# Patient Record
Sex: MALE | Race: ASIAN | NOT HISPANIC OR LATINO | URBAN - METROPOLITAN AREA
[De-identification: names, ages, dates, MRNs, and addresses within clinical notes are randomized per-mention and may not be internally consistent; named-entity substitution may affect disease eponyms.]

---

## 2022-09-20 RX ORDER — SODIUM CHLORIDE 9 MG/ML
1000 INJECTION, SOLUTION INTRAVENOUS
Refills: 0 | Status: DISCONTINUED | OUTPATIENT
Start: 2022-09-21 | End: 2022-09-21

## 2022-09-20 NOTE — ASU PATIENT PROFILE, ADULT - ABLE TO REACH PT
Time and instructions left on voice mail, no solid food after midnight , can have water or clear apple juice no pulp before 06;30 a.m. Tuesday bring photo ID, insurance and vaccination cards, someone is to come pick her up after appointment/no

## 2022-09-21 ENCOUNTER — OUTPATIENT (OUTPATIENT)
Dept: OUTPATIENT SERVICES | Facility: HOSPITAL | Age: 57
LOS: 1 days | Discharge: HOME | End: 2022-09-21

## 2022-09-21 VITALS
HEART RATE: 72 BPM | TEMPERATURE: 98 F | SYSTOLIC BLOOD PRESSURE: 120 MMHG | HEIGHT: 72 IN | WEIGHT: 186.29 LBS | OXYGEN SATURATION: 99 % | RESPIRATION RATE: 16 BRPM

## 2022-09-21 VITALS
RESPIRATION RATE: 16 BRPM | DIASTOLIC BLOOD PRESSURE: 80 MMHG | TEMPERATURE: 98 F | HEART RATE: 76 BPM | OXYGEN SATURATION: 98 % | SYSTOLIC BLOOD PRESSURE: 123 MMHG

## 2022-09-21 DIAGNOSIS — K08.409 PARTIAL LOSS OF TEETH, UNSPECIFIED CAUSE, UNSPECIFIED CLASS: Chronic | ICD-10-CM

## 2022-09-21 DIAGNOSIS — Z90.89 ACQUIRED ABSENCE OF OTHER ORGANS: Chronic | ICD-10-CM

## 2022-09-21 DEVICE — IMPLANTABLE DEVICE
Type: IMPLANTABLE DEVICE | Site: LEFT | Status: NON-FUNCTIONAL
Removed: 2022-09-21

## 2022-09-21 RX ORDER — OFLOXACIN 0.3 %
1 DROPS OPHTHALMIC (EYE)
Refills: 0 | Status: COMPLETED | OUTPATIENT
Start: 2022-09-21 | End: 2022-09-21

## 2022-09-21 RX ORDER — CYCLOPENTOLATE HYDROCHLORIDE 10 MG/ML
1 SOLUTION/ DROPS OPHTHALMIC
Refills: 0 | Status: COMPLETED | OUTPATIENT
Start: 2022-09-21 | End: 2022-09-21

## 2022-09-21 RX ORDER — KETOROLAC TROMETHAMINE 0.5 %
1 DROPS OPHTHALMIC (EYE)
Refills: 0 | Status: COMPLETED | OUTPATIENT
Start: 2022-09-21 | End: 2022-09-21

## 2022-09-21 RX ORDER — ACETAMINOPHEN 500 MG
650 TABLET ORAL ONCE
Refills: 0 | Status: DISCONTINUED | OUTPATIENT
Start: 2022-09-21 | End: 2022-09-21

## 2022-09-21 RX ORDER — TROPICAMIDE 1 %
1 DROPS OPHTHALMIC (EYE)
Refills: 0 | Status: COMPLETED | OUTPATIENT
Start: 2022-09-21 | End: 2022-09-21

## 2022-09-21 RX ORDER — PHENYLEPHRINE HCL 2.5 %
1 DROPS OPHTHALMIC (EYE)
Refills: 0 | Status: COMPLETED | OUTPATIENT
Start: 2022-09-21 | End: 2022-09-21

## 2022-09-21 RX ADMIN — CYCLOPENTOLATE HYDROCHLORIDE 1 DROP(S): 10 SOLUTION/ DROPS OPHTHALMIC at 08:48

## 2022-09-21 RX ADMIN — Medication 1 DROP(S): at 08:39

## 2022-09-21 RX ADMIN — Medication 1 DROP(S): at 09:01

## 2022-09-21 RX ADMIN — Medication 1 DROP(S): at 08:48

## 2022-09-21 RX ADMIN — Medication 1 DROP(S): at 08:40

## 2022-09-21 RX ADMIN — Medication 1 DROP(S): at 09:00

## 2022-09-21 RX ADMIN — Medication 1 DROP(S): at 08:49

## 2022-09-21 RX ADMIN — Medication 1 DROP(S): at 08:41

## 2022-09-21 RX ADMIN — CYCLOPENTOLATE HYDROCHLORIDE 1 DROP(S): 10 SOLUTION/ DROPS OPHTHALMIC at 09:01

## 2022-09-21 RX ADMIN — CYCLOPENTOLATE HYDROCHLORIDE 1 DROP(S): 10 SOLUTION/ DROPS OPHTHALMIC at 08:39

## 2022-09-21 NOTE — ASU PREOP CHECKLIST - AS TEMP SITE
Quality 226: Preventive Care And Screening: Tobacco Use: Screening And Cessation Intervention: Patient screened for tobacco use and is an ex/non-smoker Detail Level: Detailed Quality 130: Documentation Of Current Medications In The Medical Record: Current Medications Documented temporal

## 2022-09-21 NOTE — ASU DISCHARGE PLAN (ADULT/PEDIATRIC) - NS MD DC FALL RISK RISK
For information on Fall & Injury Prevention, visit: [email or web address] OR  [email or web address] OR  https://www.cdc.gov/steadi/patient.html

## 2022-09-21 NOTE — ASU PREOP CHECKLIST - NS PREOP CHK HIBICLENS NA
TCT son, Nicole Barker and explained CM program and CM role  Pt resides with autistic son in a house, [address], bed/bathroom 2nd floor  Independent prior to admission for ADL's and ambulation  PCP Ro  Has prescription plan, utilizes [facility] [address]  HHC/DME/IP Rehab- denies  Denies mental health illness, drug and/or alcohol abuse  She drives  Primary contact son Klever Owens [phone]  POA- paperwork completed, no finalized  Family or friends will provide transport home    CM reviewed d/c planning process including the following: identifying help at home, patient preference for d/c planning needs, Discharge Lounge, [facility] Meds to Bed program, availability of treatment team to discuss questions or concerns patient and/or family may have regarding understanding medications and recognizing signs and symptoms once discharged  CM also encouraged patient to follow up with all recommended appointments after discharge  Patient advised of importance for patient and family to participate in managing patients medical well being  Patient/caregiver received discharge checklist  Content reviewed  Patient/caregiver encouraged to participate in discharge plan of care prior to discharge home  N/A

## 2022-09-21 NOTE — OPERATIVE REPORT - OPERATIVE RPOSRT DETAILS
PROCEDURE DATE:    SURGEON: KATHLEEN GOODWIN MD     ANESTHESIA:  MAC.    PREOPERATIVE DIAGNOSIS(ES):  Cataract, left eye    POSTOPERATIVE DIAGNOSIS(ES):  Cataract, left eye    OPERATION:  Femtosecond laser assisted cataract extraction with intraocular lens insertion, left eye.    COMPLICATIONS:  None.    SPECIMENS:  None.    ESTIMATED BLOOD LOSS: <1 cc    DESCRIPTION OF PROCEDURE:  The patient was identified in the ASU.  The risks, benefits, and alternatives to surgery were discussed with the patient at length including but not limtied to bleeding, infection, inflammation, decreased or loss of vision, loss of the eye, need for further surgery, aphakia, vitrectomy.  Informed consent was obtained.  The left eye was identified and marked.      The patient was then bought to the laser room and time out was taken to confirm the patients identity, site, procedure.  Tetracaine eye drops were instilled.  The left eye was marked at the 3, 6, 9 o' clock positions.  The patient was placed in the supine position on the femto table and the left eye was docked onto the laser.  The femtosecond laser was then used to construct arcuate incisions, a curvilinear continuous capsulrohexis and nuclear fragmentation.  The patient's eye was then undocked from the laser.     The patient was then brought to the operating room and placed in the supine position.  Time out was retaken.  Tetracaine eye drops were reinstilled.  The left eye was prepped and draped in the usual sterile fashion for intraocular surgery.  An eyelid speculum was then placed underneath the right eyelids.    axis incisions made by the femto was verified by toric marker.  A 1.0mm sideport blade was used to create a paracentesis.  Preservativefree 1% lidocaine was injected into the anterior chamber, followed by preservative free epinephrine for pupil dilation, followed by Viscoat.  A 2.4 keratome was used to create a temporal clear corneal incision.  Utrata forceps were used to remove the capsular button.  Hydrodissection was done with BSS, and the lens was noted to rotate freely in the capsular bag.    Phacoemulsification was accomplished  without complications.  Residual cortical material was removed using singlehandpiece coaxial polymer tipped irrigation and aspiration.   Healon was then injected into the capsular bag.  IOP was in physiological limits and ORA was used to verify lens power.  The lens measurements were confirmed with IOL master. The AVX126 +12.0 diopter lens was implanted into the capsular bag and rotated into proper position using a Kuglen hook.  Irrigation and aspiration was used to remove any residual cortical material and viscoelastic.  All wounds were hydrated and found to be watertight.  The lens was centered, and the anterior chamber was deep.  IOP was within physiological limits.  No complications were noted.    Topical antibiotics and steroids and NSAID were applied to the surface of the left eye.  The eyelid speculum was removed.  The was shielded.  The patient tolerated the procedure well and was brought to the postoperative care unit in stable condition.

## 2023-10-26 NOTE — PRE-ANESTHESIA EVALUATION ADULT - NSANTHNECKRD_ENT_A_CORE
Assumed care and report from BELKIS Francois.    The patient is resting quietly in ED stretcher, and is AAOx4 at this time. Respirations are even and unlabored, with no distress noted. The patient was placed on continuous cardiac monitor, automated BP cuff cycling Q30 minutes, and continuous pulse oximeter. The patient is aware of POC and all questions and concerns addressed at this time. The patient was offered restroom assistance and denies need to void. The patient denies further needs and has no complaints at this time. SR raised x2, bed locked and in low position with brake engaged. Call bell within reach and the patient verbalized he would call for assistance if needed. Personal belongings are at bedside within reach. Patient has a visitor at bedside.    
No

## 2024-04-14 NOTE — ASU PREOP CHECKLIST - VIA
Date of Admission:4/13/2024    SUBJECTIVE:not eating per   Laying crosswise in bed    Current Facility-Administered Medications   Medication Dose Route Frequency Provider Last Rate Last Admin    acetaminophen tablet 650 mg  650 mg Oral Q6H PRN José Miguel Cristina MD        apixaban tablet 2.5 mg  2.5 mg Oral BID José Miguel Cristina MD   2.5 mg at 04/14/24 0843    aspirin EC tablet 81 mg  81 mg Oral Daily José Miguel Cristina MD   81 mg at 04/14/24 0843    atorvastatin tablet 40 mg  40 mg Oral Daily José Miguel Cristina MD   40 mg at 04/14/24 0843    ceFEPIme (MAXIPIME) 1 g in dextrose 5 % in water (D5W) 100 mL IVPB (MB+)  1 g Intravenous Q12H José Miguel Cristina MD   Stopped at 04/14/24 0810    famotidine tablet 20 mg  20 mg Oral Daily José Miguel Cristina MD   20 mg at 04/14/24 0843    levothyroxine tablet 75 mcg  75 mcg Oral Before breakfast José Miguel Cristina MD   75 mcg at 04/14/24 0606    metoprolol succinate (TOPROL-XL) 24 hr tablet 25 mg  25 mg Oral Daily José Miguel Cristina MD        midodrine tablet 10 mg  10 mg Oral TID José Miguel Cristina MD   10 mg at 04/14/24 0843    mupirocin 2 % ointment   Nasal BID José Miguel Cristina MD   Given at 04/14/24 0842    oxyCODONE immediate release tablet 5 mg  5 mg Oral Q6H PRN José Miguel Cristina MD        vancomycin - pharmacy to dose   Intravenous pharmacy to manage frequency Artis Boyd MD         Facility-Administered Medications Ordered in Other Encounters   Medication Dose Route Frequency Provider Last Rate Last Admin    0.9%  NaCl infusion   Intravenous Continuous Soni Bhatti MD   New Bag at 07/21/23 1116       Wt Readings from Last 3 Encounters:   04/13/24 47.6 kg (104 lb 14.4 oz)   04/10/24 44.9 kg (99 lb)   03/18/24 42.2 kg (93 lb)     Temp Readings from Last 3 Encounters:   04/14/24 97.4 °F (36.3 °C) (Oral)   03/25/24 97.3 °F (36.3 °C) (Tympanic)   03/18/24 97.7 °F (36.5 °C) (Oral)     BP Readings  from Last 3 Encounters:   04/14/24 116/88   04/10/24 108/66   03/25/24 123/79     Pulse Readings from Last 3 Encounters:   04/14/24 89   04/10/24 (!) 56   03/25/24 97       Intake/Output Summary (Last 24 hours) at 4/14/2024 1325  Last data filed at 4/14/2024 1230  Gross per 24 hour   Intake 1366.16 ml   Output 0 ml   Net 1366.16 ml       PE:  GEN:wd thin female in nad  HEENT:ncat,eomi,mm  CVS:s1s2 regular  PULM:ctab  ABD:bs,soft  EXT:no leg edema  NEURO:awake  Pc of chest    Recent Labs   Lab 04/14/24  0441   GLU 54*      K 4.9      CO2 14*   BUN 38*   CREATININE 4.9*   CALCIUM 10.3       Lab Results   Component Value Date    PTH 1,437 (H) 04/09/2024    CALCIUM 10.3 04/14/2024    PHOS 4.0 04/13/2024       Recent Labs   Lab 04/14/24  0441   WBC 7.97   RBC 3.94*   HGB 11.1*   HCT 37.2   *   MCV 94   MCH 28.2   MCHC 29.8*           A/P:  1.esrd. cont hd tts.  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 Hg at goal. No epo.  3.fever. cx ngtd.  Vanc level ok. Renal dose meds.  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 Phos ok.  5.lactic acid.  Bicarb. Marily level.  6.maln. add supplement   ambulate

## 2024-09-19 NOTE — ASU PATIENT PROFILE, ADULT - NS TRANSFER DISPOSITION PATIENT BELONGINGS
[Reviewed Clinical Lab Test(s)] : Results of clinical tests were reviewed. [Reviewed Radiology Report(s)] : Radiology reports were reviewed. given to Security

## (undated) DEVICE — MILOOP LENS MILOOP FRAGMENTATION DEVICE

## (undated) DEVICE — NUCLEUS HYDRODISSECTOR PEARCE ANGLED 25G X 22MM

## (undated) DEVICE — SLEEVE INTREPID MICROSMOOTH ULTRA INFUSION 0.9MM (PINK)

## (undated) DEVICE — KNIFE ALCON PARACENTESIS CLEARCUT SIDEPORT 1MM (YELLOW)

## (undated) DEVICE — SUT NYLON 10-0 12" CU-5

## (undated) DEVICE — KNIFE ALCON SLIT INTREPID CLEAR-CUT SAFETY 2.4MM

## (undated) DEVICE — KIT CENTURION ANTERIOR

## (undated) DEVICE — PACK CENTURION 2.75MM

## (undated) DEVICE — Device

## (undated) DEVICE — GLV 7.5 PROTEXIS (WHITE)

## (undated) DEVICE — PACK ANTERIOR SEGMENT

## (undated) DEVICE — SOL IRR BAL SALT 500ML

## (undated) DEVICE — WARMING BLANKET LOWER ADULT

## (undated) DEVICE — TRANSFORMER INTREPID I/A 0.3MM

## (undated) DEVICE — DRAPE MICROSCOPE KNOB COVER SMALL (2 PCS)